# Patient Record
Sex: MALE | Race: WHITE | NOT HISPANIC OR LATINO | Employment: FULL TIME | URBAN - METROPOLITAN AREA
[De-identification: names, ages, dates, MRNs, and addresses within clinical notes are randomized per-mention and may not be internally consistent; named-entity substitution may affect disease eponyms.]

---

## 2018-10-05 ENCOUNTER — OFFICE VISIT (OUTPATIENT)
Dept: LAB | Facility: SCHOOL | Age: 43
End: 2018-10-05
Payer: COMMERCIAL

## 2018-10-05 VITALS
TEMPERATURE: 95.6 F | SYSTOLIC BLOOD PRESSURE: 104 MMHG | BODY MASS INDEX: 27.5 KG/M2 | HEIGHT: 72 IN | DIASTOLIC BLOOD PRESSURE: 76 MMHG | HEART RATE: 97 BPM | OXYGEN SATURATION: 99 % | RESPIRATION RATE: 16 BRPM | WEIGHT: 203 LBS

## 2018-10-05 DIAGNOSIS — Z00.00 WELLNESS EXAMINATION: ICD-10-CM

## 2018-10-05 DIAGNOSIS — Z23 NEED FOR VACCINATION: Primary | ICD-10-CM

## 2018-10-05 LAB
CHOLEST SERPL-MCNC: 209 MG/DL
GLUCOSE SERPL-MCNC: 89 MG/DL (ref 70–99)
HDLC SERPL-MCNC: 64 MG/DL
LDLC SERPL CALC-MCNC: 126 MG/DL
NONHDLC SERPL-MCNC: 145 MG/DL
TRIGL SERPL-MCNC: 95 MG/DL

## 2018-10-05 PROCEDURE — 80061 LIPID PANEL: CPT | Performed by: NURSE PRACTITIONER

## 2018-10-05 PROCEDURE — 36415 COLL VENOUS BLD VENIPUNCTURE: CPT

## 2018-10-05 PROCEDURE — 82947 ASSAY GLUCOSE BLOOD QUANT: CPT

## 2018-10-05 PROCEDURE — 99203 OFFICE O/P NEW LOW 30 MIN: CPT | Mod: 25

## 2018-10-05 PROCEDURE — 90471 IMMUNIZATION ADMIN: CPT

## 2018-10-05 PROCEDURE — 90715 TDAP VACCINE 7 YRS/> IM: CPT

## 2018-10-05 NOTE — NURSING NOTE
Screening Questionnaire for Adult Immunization    Are you sick today?   No   Do you have allergies to medications, food, a vaccine component or latex?   No   Have you ever had a serious reaction after receiving a vaccination?   No   Do you have a long-term health problem with heart disease, lung disease, asthma, kidney disease, metabolic disease (e.g. diabetes), anemia, or other blood disorder?   No   Do you have cancer, leukemia, HIV/AIDS, or any other immune system problem?   No   In the past 3 months, have you taken medications that affect  your immune system, such as prednisone, other steroids, or anticancer drugs; drugs for the treatment of rheumatoid arthritis, Crohn s disease, or psoriasis; or have you had radiation treatments?   No   Have you had a seizure, or a brain or other nervous system problem?   No   During the past year, have you received a transfusion of blood or blood     products, or been given immune (gamma) globulin or antiviral drug?   No   For women: Are you pregnant or is there a chance you could become        pregnant during the next month?   No   Have you received any vaccinations in the past 4 weeks?   No     Immunization questionnaire answers were all negative.        Per orders of LORRIE Julio CNP, injection of Adacel given by Aletha Sam. Patient instructed to remain in clinic for 15 minutes afterwards, and to report any adverse reaction to me immediately.       Screening performed by Aletha Sam on 10/5/2018 at 8:29 AM.

## 2018-10-05 NOTE — PATIENT INSTRUCTIONS
"                Alphonse Jiménez has completed a Wellness Check at the [facility] on 10/5/2018.      ____________________________________________  [employer] SCHO[employer] PROVIDER                                                                               Wellness Visit Recommendations:      See your regular primary care health provider every year in order to help stay healthy.    Review health changes.     Review your medicines if your doctor has prescribed any.    Talk to your provider about how often to have your cholesterol checked.    If you are at risk for diabetes, you should have a diabetes test (fasting glucose).    Shots: Get a flu shot each year. Get a tetanus shot every 10 years.     Review with your primary care provider other immunizations that you may need based on your age and/or medical/surgical history    Nutrition:     Eat at least 5 servings of fruits and vegetables each day.    Eat whole-grain bread, whole-wheat pasta and brown rice instead of white grains and rice.    Preventive Care to be reviewed by your primary care provider:    Females:        Cervical Cancer Screening                          Breast Cancer Screening                          Colon Cancer Screening  Males:             Prostrate Cancer Screening                          Colon Cancer Screening      Lifestyle:    Exercise at least 150 minutes a week (30 minutes a day, 5 days of the week). This will help you control your weight and help prevent disease or manage disease.    Limit alcohol to one drink per day or less depending on your past medical history.    No smoking.     Wear sunscreen to prevent skin cancer.    See your dentist every six months for an exam and cleaning.    Today's Vital Signs:  /76 (BP Location: Right arm, Patient Position: Sitting, Cuff Size: Adult Regular)  Pulse 97  Temp 95.6  F (35.3  C) (Tympanic)  Resp 16  Ht 5' 11.85\" (1.825 m)  Wt 203 lb (92.1 kg)  SpO2 99%  BMI 27.65 kg/m2 "

## 2018-10-05 NOTE — PROGRESS NOTES
"  SUBJECTIVE:  CC: Alphonse Jiménez is an 43 year old man who presents for Wellness Check at [id number].     Review of Healthy Lifestyle:    Do you get at least three servings of calcium containing foods daily (dairy, green leafy vegetables, etc.)? yes     Do you have a high-fiber diet? yes    Amount of exercise or daily activities, outside of work: 7 day(s) per week for 90 min    Do you wear sunscreen on a regular basis? Yes      Are you taking your medications regularly not applicable    Have you had an eye exam in the past two years? no    Do you see a dentist twice per year? yes    Do you have sleep apnea, excessive snoring or excessive daytime drowsiness? no    Do you use tobacco in any form? no       OBJECTIVE:    Vitals: /76 (BP Location: Right arm, Patient Position: Sitting, Cuff Size: Adult Regular)  Pulse 97  Temp 95.6  F (35.3  C) (Tympanic)  Resp 16  Ht 5' 11.85\" (1.825 m)  Wt 203 lb (92.1 kg)  SpO2 99%  BMI 27.65 kg/m2  BMI= Body mass index is 27.65 kg/(m^2).    HEARING: :  Testing not done; patient declined    VISION:   Testing not done; patient declined    Medication Reconciliation: complete    ASSESSMENT/PLAN: Vaccinated for tetanus. Fasting labs drawn.     COUNSELING:      reports that he has never smoked. He has never used smokeless tobacco.    Estimated body mass index is 27.65 kg/(m^2) as calculated from the following:    Height as of this encounter: 5' 11.85\" (1.825 m).    Weight as of this encounter: 203 lb (92.1 kg).   Weight management plan: Discussed healthy diet and exercise guidelines and patient will follow up in 12 months in clinic to re-evaluate.    Counseling Resources  [employer] MyPlate  [email or web address].quitplan.[email or web address]    LORRIE Rodriguez [facility] SCHOOL PROVIDER  [facility]     "

## 2018-10-05 NOTE — MR AVS SNAPSHOT
After Visit Summary   10/5/2018    Alphonse Jiménez    MRN: [id number]           Patient Information     Date Of Birth          1975        Visit Information        Provider Department      10/5/2018 8:00 AM Provider, [facility]        Care Instructions                    Alphonse Jiménez has completed a Wellness Check at the [facility] on 10/5/2018.      ____________________________________________  [facility] PROVIDER                                                                               Wellness Visit Recommendations:      See your regular primary care health provider every year in order to help stay healthy.    Review health changes.     Review your medicines if your doctor has prescribed any.    Talk to your provider about how often to have your cholesterol checked.    If you are at risk for diabetes, you should have a diabetes test (fasting glucose).    Shots: Get a flu shot each year. Get a tetanus shot every 10 years.     Review with your primary care provider other immunizations that you may need based on your age and/or medical/surgical history    Nutrition:     Eat at least 5 servings of fruits and vegetables each day.    Eat whole-grain bread, whole-wheat pasta and brown rice instead of white grains and rice.    Preventive Care to be reviewed by your primary care provider:    Females:        Cervical Cancer Screening                          Breast Cancer Screening                          Colon Cancer Screening  Males:             Prostrate Cancer Screening                          Colon Cancer Screening      Lifestyle:    Exercise at least 150 minutes a week (30 minutes a day, 5 days of the week). This will help you control your weight and help prevent disease or manage disease.    Limit alcohol to one drink per day or less depending on your past medical history.    No smoking.     Wear sunscreen to prevent skin  "cancer.    See your dentist every six months for an exam and cleaning.    Today's Vital Signs:  /76 (BP Location: Right arm, Patient Position: Sitting, Cuff Size: Adult Regular)  Pulse 97  Temp 95.6  F (35.3  C) (Tympanic)  Resp 16  Ht 5' 11.85\" (1.825 m)  Wt 203 lb (92.1 kg)  SpO2 99%  BMI 27.65 kg/m2          Follow-ups after your visit        Who to contact     If you have questions or need follow up information about today's clinic visit or your schedule please contact [facility] directly at [phone].  Normal or non-critical lab and imaging results will be communicated to you by MyChart, letter or phone within 4 business days after the clinic has received the results. If you do not hear from us within 7 days, please contact the clinic through [employer] or phone. If you have a critical or abnormal lab result, we will notify you by phone as soon as possible.  Submit refill requests through [employer] or call your pharmacy and they will forward the refill request to us. Please allow 3 business days for your refill to be completed.          Additional Information About Your Visit        Flipsw[employer]art Information     [employer] lets you send messages to your doctor, view your test results, renew your prescriptions, schedule appointments and more. To sign up, go to [email or web address] . Click on \"Log in\" on the left side of the screen, which will take you to the Welcome page. Then click on \"Sign up Now\" on the right side of the page.     You will be asked to enter the access code listed below, as well as some personal information. Please follow the directions to create your username and password.     Your access code is: [email or web address]  Expires: 1/3/2019  8:11 AM     Your access code will  in 90 days. If you need help or a new code, please call your [facility] or [phone].        Care EveryWhere ID     This is your Care EveryWhere ID. This could be used by other " "organizations to access your [facility] medical records  [id number]        Your Vitals Were     Pulse Temperature Respirations Height Pulse Oximetry BMI (Body Mass Index)    97 95.6  F (35.3  C) (Tympanic) 16 5' 11.85\" (1.825 m) 99% 27.65 kg/m2       Blood Pressure from Last 3 Encounters:   10/05/18 104/76    Weight from Last 3 Encounters:   10/05/18 203 lb (92.1 kg)              Today, you had the following     No orders found for display       Primary Care Provider Fax #    Physician No Ref-Primary [phone]       No address on file        Equal Access to Services     [facility]: Hadii aad alex hadasho Soomaali, waaxda luqadaha, qaybta kaalmada adeegyada, aurelia ojeda . So [facility] [phone].    ATENCIÓN: Si habla español, tiene a cohen disposición servicios gratuitos de asistencia lingüística. Llame al [phone].    We comply with applicable federal civil rights laws and [place] laws. We do not discriminate on the basis of race, color, national origin, age, disability, sex, sexual orientation, or gender identity.            Thank you!     Thank you for choosing [place] [address]  for your care. Our goal is always to provide you with excellent care. Hearing back from our patients is one way we can continue to improve our services. Please take a few minutes to complete the written survey that you may receive in the mail after your visit with us. Thank you!             Your Updated Medication List - Protect others around you: Learn how to safely use, store and throw away your medicines at www.disposemymeds.org.      Notice  As of 10/5/2018  8:11 AM    You have not been prescribed any medications.      "

## 2019-12-11 ENCOUNTER — OFFICE VISIT (OUTPATIENT)
Dept: LAB | Facility: SCHOOL | Age: 44
End: 2019-12-11
Payer: COMMERCIAL

## 2019-12-11 VITALS
WEIGHT: 232 LBS | SYSTOLIC BLOOD PRESSURE: 118 MMHG | BODY MASS INDEX: 32.48 KG/M2 | DIASTOLIC BLOOD PRESSURE: 84 MMHG | HEIGHT: 71 IN | OXYGEN SATURATION: 98 % | HEART RATE: 73 BPM | TEMPERATURE: 97.6 F

## 2019-12-11 DIAGNOSIS — Z00.00 WELLNESS EXAMINATION: Primary | ICD-10-CM

## 2019-12-11 PROCEDURE — 99213 OFFICE O/P EST LOW 20 MIN: CPT

## 2019-12-11 ASSESSMENT — MIFFLIN-ST. JEOR: SCORE: 1954.48

## 2019-12-11 NOTE — PATIENT INSTRUCTIONS
"                Alphonse Jiménez has completed a Wellness Check at the [facility] on 12/11/2019.      ____________________________________________  [employer] PROVIDER                                                                               Wellness Visit Recommendations:      See your regular primary care health provider every year in order to help stay healthy.    Review health changes.     Review your medicines if your doctor has prescribed any.    Talk to your provider about how often to have your cholesterol checked.    If you are at risk for diabetes, you should have a diabetes test (fasting glucose).    Shots: Get a flu shot each year. Get a tetanus shot every 10 years.     Review with your primary care provider other immunizations that you may need based on your age and/or medical/surgical history    Nutrition:     Eat at least 5 servings of fruits and vegetables each day.    Eat whole-grain bread, whole-wheat pasta and brown rice instead of white grains and rice.    Preventive Care to be reviewed by your primary care provider:    Females:        Cervical Cancer Screening                          Breast Cancer Screening                          Colon Cancer Screening  Males:             Prostrate Cancer Screening                          Colon Cancer Screening      Lifestyle:    Exercise at least 150 minutes a week (30 minutes a day, 5 days of the week). This will help you control your weight and help prevent disease or manage disease.    Limit alcohol to one drink per day or less depending on your past medical history.    No smoking.     Wear sunscreen to prevent skin cancer.    See your dentist every six months for an exam and cleaning.    Today's Vital Signs:  /84   Pulse 73   Temp 97.6  F (36.4  C) (Tympanic)   Ht 1.803 m (5' 11\")   Wt 105.2 kg (232 lb)   SpO2 98%   BMI 32.36 kg/m    "

## 2019-12-11 NOTE — PROGRESS NOTES
"  SUBJECTIVE:  CC: Alphonse Jiménez is an 44 year old woman who presents for Wellness Check at [facility] [id number].     Review of Healthy Lifestyle:    Do you get at least three servings of calcium containing foods daily (dairy, green leafy vegetables, etc.)? yes     Do you have a high-fiber diet? no     Amount of exercise or daily activities, outside of work: 5 day(s) per week    Do you wear sunscreen on a regular basis? Yes      Are you taking your medications regularly not applicable    Have you had an eye exam in the past two years? no    Do you see a dentist twice per year? yes    Do you have sleep apnea, excessive snoring or excessive daytime drowsiness? no    Do you use tobacco in any form? no       OBJECTIVE:    Vitals: /84   Pulse 73   Temp 97.6  F (36.4  C) (Tympanic)   Ht 1.803 m (5' 11\")   Wt 105.2 kg (232 lb)   SpO2 98%   BMI 32.36 kg/m    BMI= Body mass index is 32.36 kg/m .    HEARING: Right Ear:        500Hz:  RESPONSE- on Level:   20 db    1000 Hz: RESPONSE- on Level:   20 db    2000 Hz: RESPONSE- on Level:   20 db    4000 Hz: RESPONSE- on Level:   20 db     Left Ear:       500Hz:  RESPONSE- on Level:   20 db    1000 Hz: RESPONSE- on Level:   20 db    2000 Hz: RESPONSE- on Level:   20 db    4000 Hz: RESPONSE- on Level:   20 db     VISION:  Right eye:  20/20  Left eye:     20/20  Both eyes: 20/20  Corrective lenses?  No    Medication Reconciliation: complete    ASSESSMENT/PLAN:    (Z00.00) Wellness examination  (primary encounter diagnosis)  Comment: declines glucose, cholesterol testing.  Plan: will follow up with primary care provider as needed.        COUNSELING:      reports that he has never smoked. He has never used smokeless tobacco.    Estimated body mass index is 32.36 kg/m  as calculated from the following:    Height as of this encounter: 1.803 m (5' 11\").    Weight as of this encounter: 105.2 kg (232 lb).   Weight management plan: Discussed healthy diet " and exercise guidelines    Counseling Resources  [employer]'s MyPlate  [email or web address].[email or web address]    LORRIE Alejandre CNP on 12/11/2019 at 9:26 AM.

## 2019-12-11 NOTE — NURSING NOTE
"Initial /84   Pulse 73   Temp 97.6  F (36.4  C) (Tympanic)   Ht 1.803 m (5' 11\")   Wt 105.2 kg (232 lb)   SpO2 98%   BMI 32.36 kg/m   Estimated body mass index is 32.36 kg/m  as calculated from the following:    Height as of this encounter: 1.803 m (5' 11\").    Weight as of this encounter: 105.2 kg (232 lb). .    Eduarda Monique CMA ([facility])  "

## 2020-12-09 ENCOUNTER — OFFICE VISIT (OUTPATIENT)
Dept: LAB | Facility: SCHOOL | Age: 45
End: 2020-12-09
Payer: COMMERCIAL

## 2020-12-09 VITALS
WEIGHT: 225 LBS | DIASTOLIC BLOOD PRESSURE: 72 MMHG | SYSTOLIC BLOOD PRESSURE: 110 MMHG | BODY MASS INDEX: 31.5 KG/M2 | HEIGHT: 71 IN | HEART RATE: 72 BPM | TEMPERATURE: 96.7 F | OXYGEN SATURATION: 98 % | RESPIRATION RATE: 16 BRPM

## 2020-12-09 DIAGNOSIS — Z00.00 WELLNESS EXAMINATION: Primary | ICD-10-CM

## 2020-12-09 LAB
CHOLEST SERPL-MCNC: 232 MG/DL
GLUCOSE SERPL-MCNC: 94 MG/DL (ref 70–99)
HDLC SERPL-MCNC: 65 MG/DL
LDLC SERPL CALC-MCNC: 145 MG/DL
NONHDLC SERPL-MCNC: 167 MG/DL
TRIGL SERPL-MCNC: 111 MG/DL

## 2020-12-09 PROCEDURE — 82947 ASSAY GLUCOSE BLOOD QUANT: CPT

## 2020-12-09 PROCEDURE — 80061 LIPID PANEL: CPT | Performed by: NURSE PRACTITIONER

## 2020-12-09 PROCEDURE — 36415 COLL VENOUS BLD VENIPUNCTURE: CPT | Performed by: NURSE PRACTITIONER

## 2020-12-09 PROCEDURE — 99213 OFFICE O/P EST LOW 20 MIN: CPT

## 2020-12-09 ASSESSMENT — MIFFLIN-ST. JEOR: SCORE: 1917.72

## 2020-12-09 NOTE — LETTER
"December 10, 2020      Alphonse Jiménez  [address] [address] [facility] [address]        Dear ,    We are writing to inform you of your test results.          Covering for Dia Hawley:   Your blood sugar is normal.   Your cholesterol is borderline.   Recommend working on healthy diet, regular exercise and weight loss.   Recheck labs in one year.     Good fat versus bad fat:     Saturated fats are bad for cholesterol.  These are fats that are solid at room temperature.  You should limit or avoid these bad fats.  Examples are whole milk products, cheese, butter, cream, fatty meats, red meat, poultry skin, cold cuts, baked goods, highly processed foods, margarine, vegetable shortenings, foods with partially hydrogenated vegetable oil, coconut oil.     Unsaturated fats are good fats and help lower your LDL (bad cholesterol).  You should choose to eat more of these good fats, examples are olive oil, canola oil, nuts, seeds, avocado, almonds, pecans, cashews, pistachios, fish oil, corn oil, soybean oil, safflower oil, sunflower oil, walnuts.     Choose omega-3 fats, including salmon, sardines, bluefish, mackerel, walnut, canola, soybean, flaxseed.         Good carbs versus bad carbs:     Minimize sugar and white flour, including white bread, white rice and other refined grains.  Limit potatoes.  Minimize processed food and sugar sweetened beverages such as sodas and fruit drinks.     Eat more whole fruits (instead of fruit juice).  Eat more beans and legumes.  Snack on raw vegetables instead of chips, crackers, or chocolate bars.     Eat more whole grains, such as whole-wheat, barley, wheat berries, quinoa, oats and brown rice.       Good websites:     www.oldwayspt.org   www.eatright.org       Recommend following the Mediterranean diet. This is lifestyle, not a \"diet\". It has been proved to be effective in reducing weight, BMI, and waist circumference; decreasing total cholesterol and increasing HDL; better " glycemic control and reduce insulin resistance. It also has been shown to decrease cardiovascular disease.       Terra Swann, ARABELLA          Resulted Orders   Glucose whole blood   Result Value Ref Range    Glucose 94 70 - 99 mg/dL   Lipid Profile   Result Value Ref Range    Cholesterol 232 (H) <200 mg/dL      Comment:      Desirable:       <200 mg/dl    Triglycerides 111 <150 mg/dL    HDL Cholesterol 65 >39 mg/dL    LDL Cholesterol Calculated 145 (H) <100 mg/dL      Comment:      Above desirable:  100-129 mg/dl  Borderline High:  130-159 mg/dL  High:             160-189 mg/dL  Very high:       >189 mg/dl      Non HDL Cholesterol 167 (H) <130 mg/dL      Comment:      Above Desirable:  130-159 mg/dl  Borderline high:  160-189 mg/dl  High:             190-219 mg/dl  Very high:       >219 mg/dl         If you have any questions or concerns, please call the clinic at the number listed above.       Sincerely,      LORRIE Banks CNP

## 2020-12-09 NOTE — PATIENT INSTRUCTIONS
"                Alphonse Jiménez has completed a Wellness Check at the [facility] on 12/9/2020.      ____________________________________________  [employer] SCHOOL PROVIDER                                                                               Wellness Visit Recommendations:      See your regular primary care health provider every year in order to help stay healthy.    Review health changes.     Review your medicines if your doctor has prescribed any.    Talk to your provider about how often to have your cholesterol checked.    If you are at risk for diabetes, you should have a diabetes test (fasting glucose).    Shots: Get a flu shot each year. Get a tetanus shot every 10 years.     Review with your primary care provider other immunizations that you may need based on your age and/or medical/surgical history    Nutrition:     Eat at least 5 servings of fruits and vegetables each day.    Eat whole-grain bread, whole-wheat pasta and brown rice instead of white grains and rice.    Preventive Care to be reviewed by your primary care provider:    Females:        Cervical Cancer Screening                          Breast Cancer Screening                          Colon Cancer Screening  Males:             Prostrate Cancer Screening                          Colon Cancer Screening      Lifestyle:    Exercise at least 150 minutes a week (30 minutes a day, 5 days of the week). This will help you control your weight and help prevent disease or manage disease.    Limit alcohol to one drink per day or less depending on your past medical history.    No smoking.     Wear sunscreen to prevent skin cancer.    See your dentist every six months for an exam and cleaning.    Today's Vital Signs:  /72 (BP Location: Right arm, Patient Position: Sitting, Cuff Size: Adult Large)   Pulse 72   Temp 96.7  F (35.9  C) (Tympanic)   Resp 16   Ht 1.803 m (5' 11\")   Wt 102.1 kg (225 lb)   SpO2 98%   BMI 31.38 " kg/m

## 2020-12-09 NOTE — PROGRESS NOTES
"  SUBJECTIVE:  CC: Alphonse Jiménez is an 45 year old woman who presents for Wellness Check at [id number].     Review of Healthy Lifestyle:    Do you get at least three servings of calcium containing foods daily (dairy, green leafy vegetables, etc.)? yes     Do you have a high-fiber diet? yes     Amount of exercise or daily activities, outside of work: 7 day(s) per week    Do you wear sunscreen on a regular basis? Yes 30-50     Are you taking your medications regularly not applicable    Have you had an eye exam in the past two years? yes    Do you see a dentist twice per year? yes    Do you have sleep apnea, excessive snoring or excessive daytime drowsiness? no    Do you use tobacco in any form? no       OBJECTIVE:    Vitals: /72 (BP Location: Right arm, Patient Position: Sitting, Cuff Size: Adult Large)   Pulse 72   Temp 96.7  F (35.9  C) (Tympanic)   Resp 16   Ht 1.803 m (5' 11\")   Wt 102.1 kg (225 lb)   SpO2 98%   BMI 31.38 kg/m    BMI= Body mass index is 31.38 kg/m .    HEARING: Right Ear:        500Hz:  RESPONSE- on Level:   20 db    1000 Hz: RESPONSE- on Level: 40 db   2000 Hz: RESPONSE- on Level:   20 db    4000 Hz: RESPONSE- on Level:   20 db     Left Ear:       500Hz:  RESPONSE- on Level:   20 db    1000 Hz: RESPONSE- on Level: 40 db   2000 Hz: RESPONSE- on Level:   20 db    4000 Hz: RESPONSE- on Level:   20 db     VISION:      Medication Reconciliation: complete    ASSESSMENT/PLAN:  (Z00.00) Wellness examination  (primary encounter diagnosis)  Comment: Doing well, will repeat lipid panel today. Declines flu shot.  Plan: Glucose whole blood, Lipid Profile              COUNSELING:      reports that he has never smoked. He has never used smokeless tobacco.    Estimated body mass index is 31.38 kg/m  as calculated from the following:    Height as of this encounter: 1.803 m (5' 11\").    Weight as of this encounter: 102.1 kg (225 lb).   Weight management plan: " Discussed healthy diet and exercise guidelines    Counseling Resources  [employer]'s MyPlate  [email or web address].quitplan.[email or web address]  Dia Hawley CNP  [place] SCHO[employer] PROVIDER  [facility]

## 2022-06-03 ENCOUNTER — OFFICE VISIT (OUTPATIENT)
Dept: LAB | Facility: SCHOOL | Age: 47
End: 2022-06-03
Payer: COMMERCIAL

## 2022-06-03 VITALS
HEART RATE: 80 BPM | SYSTOLIC BLOOD PRESSURE: 124 MMHG | RESPIRATION RATE: 18 BRPM | BODY MASS INDEX: 32.23 KG/M2 | DIASTOLIC BLOOD PRESSURE: 82 MMHG | WEIGHT: 238 LBS | HEIGHT: 72 IN | OXYGEN SATURATION: 99 %

## 2022-06-03 DIAGNOSIS — Z00.8 ENCOUNTER FOR BIOMETRIC SCREENING: Primary | ICD-10-CM

## 2022-06-03 DIAGNOSIS — Z12.11 SPECIAL SCREENING FOR MALIGNANT NEOPLASMS, COLON: ICD-10-CM

## 2022-06-03 DIAGNOSIS — Z00.00 WELLNESS EXAMINATION: ICD-10-CM

## 2022-06-03 LAB
CHOLEST SERPL-MCNC: 221 MG/DL
FASTING STATUS PATIENT QL REPORTED: NO
GLUCOSE BLD-MCNC: 111 MG/DL (ref 60–99)
HDLC SERPL-MCNC: 42 MG/DL
LDLC SERPL CALC-MCNC: 150 MG/DL
NONHDLC SERPL-MCNC: 179 MG/DL
TRIGL SERPL-MCNC: 146 MG/DL

## 2022-06-03 PROCEDURE — 36415 COLL VENOUS BLD VENIPUNCTURE: CPT

## 2022-06-03 PROCEDURE — 99213 OFFICE O/P EST LOW 20 MIN: CPT

## 2022-06-03 PROCEDURE — 80061 LIPID PANEL: CPT | Performed by: NURSE PRACTITIONER

## 2022-06-03 PROCEDURE — 82947 ASSAY GLUCOSE BLOOD QUANT: CPT

## 2022-06-03 ASSESSMENT — PAIN SCALES - GENERAL: PAINLEVEL: NO PAIN (0)

## 2022-06-03 NOTE — PROGRESS NOTES
SUBJECTIVE:  CC: Alphonse is a 46 year old who presents for Wellness Check at [facility].     Review of Healthy Lifestyle:    Do you get at least three servings of calcium containing foods daily (dairy, green leafy vegetables, etc.)? yes     Do you have a high-fiber diet? yes     Amount of exercise or daily activities, outside of work: 5 day(s) per week    Do you wear sunscreen on a regular basis? Yes      Are you taking your medications regularly not applicable    Have you had an eye exam in the past two years? no    Do you see a dentist twice per year? yes    Do you have sleep apnea, excessive snoring or excessive daytime drowsiness? no    Do you use tobacco in any form? no       OBJECTIVE:    Vitals: /82 (BP Location: Right arm, Patient Position: Sitting, Cuff Size: Adult Large)   Pulse 80   Resp 18   Ht 1.829 m (6')   Wt 108 kg (238 lb)   SpO2 99%   BMI 32.28 kg/m    BMI= Body mass index is 32.28 kg/m .     HEARING: :  Testing not done:  No concerns    VISION: No concerns at this time      Medication Reconciliation: complete    ASSESSMENT/PLAN:  (Z00.8) Encounter for biometric screening  (primary encounter diagnosis)  Comment: labs sent  Plan: Glucose whole blood, Lipid Profile            (Z00.00) Wellness examination  Comment: doing well, no needs  Plan:     (Z12.11) Special screening for malignant neoplasms, colon  Comment: Due for colon cancer screening, reviewed options, will proceed with colonoscopy.  Plan: Adult Gastro Ref - Procedure Only            COUNSELING:      reports that he has never smoked. He has never used smokeless tobacco.    Estimated body mass index is 32.28 kg/m  as calculated from the following:    Height as of this encounter: 1.829 m (6').    Weight as of this encounter: 108 kg (238 lb).   Weight management plan: Discussed healthy diet and exercise guidelines    Counseling Resources  [employer]  [email or web address].[email or web address]  Dia DALEY  Chandan, CNP  [place] [employer]  [facility]

## 2022-06-03 NOTE — PATIENT INSTRUCTIONS
Deejay Hennen has completed a Wellness Check at the [facility] on 6/3/2022.      ____________________________________________  [employer]                                                                               Wellness Visit Recommendations:    See your regular primary care health provider every year in order to help stay healthy.  Review health changes.   Review your medicines if your doctor has prescribed any.  Talk to your provider about how often to have your cholesterol checked.  If you are at risk for diabetes, you should have a diabetes test (fasting glucose).  Shots: Get a flu shot each year. Get a tetanus shot every 10 years.   Review with your primary care provider other immunizations that you may need based on your age and/or medical/surgical history    Nutrition:   Eat at least 5 servings of fruits and vegetables each day.  Eat whole-grain bread, whole-wheat pasta and brown rice instead of white grains and rice.    Preventive Care to be reviewed by your primary care provider:    Females:        Cervical Cancer Screening                          Breast Cancer Screening                          Colon Cancer Screening  Males:             Prostrate Cancer Screening                          Colon Cancer Screening      Lifestyle:  Exercise at least 150 minutes a week (30 minutes a day, 5 days of the week). This will help you control your weight and help prevent disease or manage disease.  Limit alcohol to one drink per day or less depending on your past medical history.  No smoking.   Wear sunscreen to prevent skin cancer.  See your dentist every six months for an exam and cleaning.    Today's Vital Signs:  /82 (BP Location: Right arm, Patient Position: Sitting, Cuff Size: Adult Large)   Pulse 80   Resp 18   Ht 1.829 m (6')   Wt 108 kg (238 lb)   SpO2 99%   BMI 32.28 kg/m

## 2025-01-11 ENCOUNTER — ANCILLARY PROCEDURE (OUTPATIENT)
Dept: GENERAL RADIOLOGY | Facility: CLINIC | Age: 50
End: 2025-01-11
Attending: PHYSICIAN ASSISTANT
Payer: COMMERCIAL

## 2025-01-11 ENCOUNTER — OFFICE VISIT (OUTPATIENT)
Dept: URGENT CARE | Facility: URGENT CARE | Age: 50
End: 2025-01-11
Payer: COMMERCIAL

## 2025-01-11 VITALS
TEMPERATURE: 99.9 F | WEIGHT: 238.38 LBS | SYSTOLIC BLOOD PRESSURE: 122 MMHG | HEART RATE: 89 BPM | OXYGEN SATURATION: 96 % | BODY MASS INDEX: 32.33 KG/M2 | DIASTOLIC BLOOD PRESSURE: 84 MMHG

## 2025-01-11 DIAGNOSIS — J22 LOWER RESP. TRACT INFECTION: Primary | ICD-10-CM

## 2025-01-11 DIAGNOSIS — R05.1 ACUTE COUGH: ICD-10-CM

## 2025-01-11 DIAGNOSIS — R50.9 FEVER, UNSPECIFIED: ICD-10-CM

## 2025-01-11 LAB
FLUAV AG SPEC QL IA: NEGATIVE
FLUBV AG SPEC QL IA: NEGATIVE

## 2025-01-11 PROCEDURE — 71046 X-RAY EXAM CHEST 2 VIEWS: CPT | Mod: TC | Performed by: RADIOLOGY

## 2025-01-11 PROCEDURE — 87804 INFLUENZA ASSAY W/OPTIC: CPT | Performed by: PHYSICIAN ASSISTANT

## 2025-01-11 PROCEDURE — 99204 OFFICE O/P NEW MOD 45 MIN: CPT | Performed by: PHYSICIAN ASSISTANT

## 2025-01-11 RX ORDER — PREDNISONE 20 MG/1
40 TABLET ORAL DAILY
Qty: 10 TABLET | Refills: 0 | Status: SHIPPED | OUTPATIENT
Start: 2025-01-11 | End: 2025-01-16

## 2025-01-11 RX ORDER — ALBUTEROL SULFATE 90 UG/1
INHALANT RESPIRATORY (INHALATION)
Qty: 18 G | Refills: 0 | Status: SHIPPED | OUTPATIENT
Start: 2025-01-11

## 2025-01-11 RX ORDER — AZITHROMYCIN 250 MG/1
TABLET, FILM COATED ORAL
Qty: 6 TABLET | Refills: 0 | Status: SHIPPED | OUTPATIENT
Start: 2025-01-11 | End: 2025-01-12

## 2025-01-11 NOTE — PROGRESS NOTES
Assessment & Plan     1. Lower resp. tract infection (Primary)  Patient having worsening cough and persistent fever, no other URI symptoms, chest x-ray is clear but will treat with doxycycline, prednisone burst, and albuterol every 4-6 hours. Get plenty of rest and push fluids. Return to clinic if symptoms worsen or do not improve; otherwise follow up as needed     - predniSONE (DELTASONE) 20 MG tablet; Take 2 tablets (40 mg) by mouth daily for 5 days.  Dispense: 10 tablet; Refill: 0  - albuterol (PROAIR HFA/PROVENTIL HFA/VENTOLIN HFA) 108 (90 Base) MCG/ACT inhaler; Inhale 2 puffs every 4-6 hours as needed for cough, wheezing, or shortness of breath  Dispense: 18 g; Refill: 0  - doxycycline monohydrate (MONODOX) 100 MG capsule; Take 1 capsule (100 mg) by mouth 2 times daily for 10 days.  Dispense: 20 capsule; Refill: 0    2. Fever, unspecified    - Influenza A & B Antigen - Clinic Collect  - XR Chest 2 Views; Future    3. Acute cough    - XR Chest 2 Views; Future               Return in about 1 week (around 1/18/2025), or if symptoms worsen or fail to improve.                Subjective   Chief Complaint   Patient presents with    Fever    Cough    Fatigue     All going on for about a week        HPI     URI Adult    Onset of symptoms was 1 week(s) ago.  Course of illness is worsening.    Severity moderate  Current and Associated symptoms: cough, fever, fatigue   Treatment measures tried include OTC Cough med.  Predisposing factors include None.                      Objective    /84 (BP Location: Right arm, Patient Position: Sitting, Cuff Size: Adult Large)   Pulse 89   Temp 99.9  F (37.7  C) (Tympanic)   Wt 108.1 kg (238 lb 6 oz)   SpO2 96%   BMI 32.33 kg/m    Body mass index is 32.33 kg/m .      Physical Exam  Constitutional:       General: He is not in acute distress.     Appearance: He is well-developed.   HENT:      Head: Normocephalic and atraumatic.      Right Ear: Tympanic membrane and ear canal  normal.      Left Ear: Tympanic membrane and ear canal normal.   Eyes:      Conjunctiva/sclera: Conjunctivae normal.   Cardiovascular:      Rate and Rhythm: Normal rate and regular rhythm.   Pulmonary:      Effort: Pulmonary effort is normal.      Breath sounds: Normal breath sounds.   Skin:     General: Skin is warm and dry.      Findings: No rash.   Psychiatric:         Behavior: Behavior normal.            Xray - Reviewed and interpreted by me.  No acute infiltrate         Signed Electronically by: Myrna Loera PA-C

## 2025-01-12 RX ORDER — DOXYCYCLINE 100 MG/1
100 CAPSULE ORAL 2 TIMES DAILY
Qty: 20 CAPSULE | Refills: 0 | Status: SHIPPED | OUTPATIENT
Start: 2025-01-12 | End: 2025-01-22